# Patient Record
Sex: MALE | Race: WHITE | Employment: UNEMPLOYED | URBAN - METROPOLITAN AREA
[De-identification: names, ages, dates, MRNs, and addresses within clinical notes are randomized per-mention and may not be internally consistent; named-entity substitution may affect disease eponyms.]

---

## 2020-06-15 ENCOUNTER — HOSPITAL ENCOUNTER (OUTPATIENT)
Age: 50
Discharge: ANOTHER HOSPITAL | End: 2020-06-15
Attending: FAMILY MEDICINE
Payer: MEDICARE

## 2020-06-15 ENCOUNTER — HOSPITAL ENCOUNTER (EMERGENCY)
Facility: HOSPITAL | Age: 50
Discharge: HOME | End: 2020-06-15
Attending: EMERGENCY MEDICINE
Payer: MEDICARE

## 2020-06-15 ENCOUNTER — APPOINTMENT (OUTPATIENT)
Dept: CT IMAGING | Age: 50
End: 2020-06-15
Attending: FAMILY MEDICINE
Payer: MEDICARE

## 2020-06-15 VITALS — SYSTOLIC BLOOD PRESSURE: 142 MMHG | OXYGEN SATURATION: 97 % | HEART RATE: 81 BPM | DIASTOLIC BLOOD PRESSURE: 95 MMHG

## 2020-06-15 VITALS
RESPIRATION RATE: 18 BRPM | HEIGHT: 63 IN | SYSTOLIC BLOOD PRESSURE: 141 MMHG | TEMPERATURE: 97 F | DIASTOLIC BLOOD PRESSURE: 93 MMHG | HEART RATE: 54 BPM | WEIGHT: 160 LBS | BODY MASS INDEX: 28.35 KG/M2 | OXYGEN SATURATION: 97 %

## 2020-06-15 DIAGNOSIS — R73.9 HYPERGLYCEMIA: Primary | ICD-10-CM

## 2020-06-15 DIAGNOSIS — R94.31 ABNORMAL EKG: ICD-10-CM

## 2020-06-15 DIAGNOSIS — R42 DIZZINESS: Primary | ICD-10-CM

## 2020-06-15 DIAGNOSIS — R73.9 HYPERGLYCEMIA: ICD-10-CM

## 2020-06-15 PROCEDURE — 83036 HEMOGLOBIN GLYCOSYLATED A1C: CPT | Performed by: EMERGENCY MEDICINE

## 2020-06-15 PROCEDURE — 81002 URINALYSIS NONAUTO W/O SCOPE: CPT | Performed by: FAMILY MEDICINE

## 2020-06-15 PROCEDURE — 99205 OFFICE O/P NEW HI 60 MIN: CPT

## 2020-06-15 PROCEDURE — 96360 HYDRATION IV INFUSION INIT: CPT

## 2020-06-15 PROCEDURE — 99284 EMERGENCY DEPT VISIT MOD MDM: CPT

## 2020-06-15 PROCEDURE — 82962 GLUCOSE BLOOD TEST: CPT

## 2020-06-15 PROCEDURE — 96361 HYDRATE IV INFUSION ADD-ON: CPT

## 2020-06-15 PROCEDURE — 36415 COLL VENOUS BLD VENIPUNCTURE: CPT

## 2020-06-15 PROCEDURE — 96372 THER/PROPH/DIAG INJ SC/IM: CPT

## 2020-06-15 PROCEDURE — 81001 URINALYSIS AUTO W/SCOPE: CPT | Performed by: EMERGENCY MEDICINE

## 2020-06-15 PROCEDURE — 84484 ASSAY OF TROPONIN QUANT: CPT

## 2020-06-15 PROCEDURE — 85025 COMPLETE CBC W/AUTO DIFF WBC: CPT | Performed by: FAMILY MEDICINE

## 2020-06-15 PROCEDURE — 93010 ELECTROCARDIOGRAM REPORT: CPT

## 2020-06-15 PROCEDURE — 80047 BASIC METABLC PNL IONIZED CA: CPT

## 2020-06-15 PROCEDURE — 93005 ELECTROCARDIOGRAM TRACING: CPT

## 2020-06-15 PROCEDURE — 80053 COMPREHEN METABOLIC PANEL: CPT | Performed by: EMERGENCY MEDICINE

## 2020-06-15 PROCEDURE — 70450 CT HEAD/BRAIN W/O DYE: CPT | Performed by: FAMILY MEDICINE

## 2020-06-15 RX ORDER — INSULIN ASPART 100 [IU]/ML
0.15 INJECTION, SOLUTION INTRAVENOUS; SUBCUTANEOUS ONCE
Status: COMPLETED | OUTPATIENT
Start: 2020-06-15 | End: 2020-06-15

## 2020-06-15 RX ORDER — PREDNISOLONE SODIUM PHOSPHATE 15 MG/5ML
60 SOLUTION ORAL ONCE
Status: DISCONTINUED | OUTPATIENT
Start: 2020-06-15 | End: 2020-06-15

## 2020-06-15 NOTE — ED PROVIDER NOTES
Patient Seen in: [facility]      History   Patient presents with:  Dizziness    Stated Complaint: Dizzy  x     HPI    **24-year-old male presents to the immediate care today with chief complaints dizziness on and off for the pas reviewed and negative except as noted above.     Physical Exam     ED Triage Vitals   BP 06/15/20 1526 (!) 144/102   Pulse 06/15/20 1526 65   Resp --    Temp --    Temp src --    SpO2 06/15/20 1524 97 %   O2 Device 06/15/20 1524 None (Room air)       Nydia the following components:    ISTAT Sodium 134 (*)     ISTAT Chloride 97 (*)     ISTAT Glucose 352 (*)     All other components within normal limits   POCT GLUCOSE - Abnormal; Notable for the following components:    POC Glucose 275 (*)     All other compon Negative    57-year-old male presents to the immediate care with hyperglycemia, abnormal EKG with complaints of dizziness. He has a known history of hypertension. However he has not been taking any medications for the last 2 years.   At the end of the enc

## 2020-06-15 NOTE — ED INITIAL ASSESSMENT (HPI)
Dizziness- 4 days on and off. States room is spinning when laying, when standing  He feels he is going to fall. Denies any h/o dizziness. Pt took dramamine yesterday x 1 , today x 1  2 pm . Also headache.  Denies fever

## 2020-06-16 NOTE — ED PROVIDER NOTES
Patient Seen in: [facility] Emergency Department      History   Patient presents with:  Hyperglycemia    Stated Complaint: hyperglycemia / dizziness    HPI    61-year-old male presents emergency department sent from immediate care as patient was fou of the exam.  Handwashing was performed prior to and after the exam.  Stethoscope and any equipment used during my examination was cleaned with super sani-cloth germicidal wipes following the exam.         Vital signs reviewed  General appearance: Patient panel order CBC WITH DIFFERENTIAL WITH PLATELET.   Procedure                               Abnormality         Status                     ---------                               -----------         ------                     CBC W/ DIFFERENTIAL[[id number]] daily with meals. , Normal, Disp-60 tablet, R-0

## 2020-07-01 PROBLEM — G89.4 CHRONIC PAIN DISORDER: Status: ACTIVE | Noted: 2020-07-01

## 2020-07-01 PROBLEM — E66.3 OVERWEIGHT (BMI 25.0-29.9): Status: ACTIVE | Noted: 2020-07-01

## 2020-07-01 PROBLEM — I10 ESSENTIAL HYPERTENSION: Status: ACTIVE | Noted: 2020-07-01

## 2020-07-01 PROBLEM — E11.9 CONTROLLED TYPE 2 DIABETES MELLITUS WITHOUT COMPLICATION, WITHOUT LONG-TERM CURRENT USE OF INSULIN (HCC): Status: ACTIVE | Noted: 2020-07-01

## 2021-11-02 PROBLEM — F11.20 OPIOID DEPENDENCE, UNCOMPLICATED (HCC): Status: ACTIVE | Noted: 2021-11-02
